# Patient Record
Sex: FEMALE | Race: WHITE | NOT HISPANIC OR LATINO | Employment: FULL TIME | URBAN - METROPOLITAN AREA
[De-identification: names, ages, dates, MRNs, and addresses within clinical notes are randomized per-mention and may not be internally consistent; named-entity substitution may affect disease eponyms.]

---

## 2023-10-03 ENCOUNTER — PHARMACY VISIT (OUTPATIENT)
Dept: PHARMACY | Facility: CLINIC | Age: 58
End: 2023-10-03
Payer: COMMERCIAL

## 2023-10-17 RX ORDER — NALTREXONE HYDROCHLORIDE AND BUPROPION HYDROCHLORIDE 8; 90 MG/1; MG/1
1 TABLET, EXTENDED RELEASE ORAL 2 TIMES DAILY
Qty: 120 TABLET | Refills: 0 | Status: SHIPPED | OUTPATIENT
Start: 2023-10-17 | End: 2023-11-14

## 2023-10-20 PROBLEM — R53.83 FATIGUE: Status: ACTIVE | Noted: 2023-10-20

## 2023-10-20 PROBLEM — M79.7 FIBROMYALGIA: Status: ACTIVE | Noted: 2023-10-20

## 2023-10-20 PROBLEM — M47.816 DEGENERATIVE JOINT DISEASE (DJD) OF LUMBAR SPINE: Status: ACTIVE | Noted: 2023-10-20

## 2023-10-20 PROBLEM — M25.561 ARTHRALGIA OF BOTH KNEES: Status: ACTIVE | Noted: 2023-10-20

## 2023-10-20 PROBLEM — K25.9 GASTRIC ULCER: Status: ACTIVE | Noted: 2023-10-20

## 2023-10-20 PROBLEM — R14.0 ABDOMINAL BLOATING: Status: ACTIVE | Noted: 2023-10-20

## 2023-10-20 PROBLEM — M32.9 SYSTEMIC LUPUS ERYTHEMATOSUS (MULTI): Status: ACTIVE | Noted: 2023-10-20

## 2023-10-20 PROBLEM — M46.1 SACROILIITIS (CMS-HCC): Status: ACTIVE | Noted: 2023-10-20

## 2023-10-20 PROBLEM — K57.30 DIVERTICULAR DISEASE OF COLON: Status: ACTIVE | Noted: 2023-10-20

## 2023-10-20 PROBLEM — K90.9 MALABSORPTION SYNDROME (HHS-HCC): Status: ACTIVE | Noted: 2023-10-20

## 2023-10-20 PROBLEM — A09 TRAVELERS' DIARRHEA: Status: ACTIVE | Noted: 2023-10-20

## 2023-10-20 PROBLEM — R10.33 PERIUMBILICAL ABDOMINAL PAIN: Status: ACTIVE | Noted: 2023-10-20

## 2023-10-20 PROBLEM — M25.562 ARTHRALGIA OF BOTH KNEES: Status: ACTIVE | Noted: 2023-10-20

## 2023-10-20 PROBLEM — G43.019 INTRACTABLE MIGRAINE WITHOUT AURA AND WITHOUT STATUS MIGRAINOSUS: Status: ACTIVE | Noted: 2023-10-20

## 2023-10-20 PROBLEM — G89.29 BACK PAIN, CHRONIC: Status: ACTIVE | Noted: 2023-10-20

## 2023-10-20 PROBLEM — N95.1 MENOPAUSAL SYNDROME (HOT FLUSHES): Status: ACTIVE | Noted: 2023-10-20

## 2023-10-20 PROBLEM — Z98.0 STATUS POST BYPASS GASTROJEJUNOSTOMY: Status: ACTIVE | Noted: 2023-10-20

## 2023-10-20 PROBLEM — M54.9 BACK PAIN, CHRONIC: Status: ACTIVE | Noted: 2023-10-20

## 2023-10-20 PROBLEM — M19.90 OSTEOARTHRITIS: Status: ACTIVE | Noted: 2023-10-20

## 2023-10-20 PROBLEM — F41.9 ANXIETY: Status: ACTIVE | Noted: 2023-10-20

## 2023-10-20 PROBLEM — I26.99 PULMONARY EMBOLISM (MULTI): Status: ACTIVE | Noted: 2023-10-20

## 2023-10-20 PROBLEM — M17.12 LEFT KNEE DJD: Status: ACTIVE | Noted: 2023-10-20

## 2023-10-20 PROBLEM — E53.8 VITAMIN B12 DEFICIENCY: Status: ACTIVE | Noted: 2023-10-20

## 2023-10-20 PROBLEM — K21.9 GERD (GASTROESOPHAGEAL REFLUX DISEASE): Status: ACTIVE | Noted: 2023-10-20

## 2023-10-20 PROBLEM — R10.32 CONTINUOUS LLQ ABDOMINAL PAIN: Status: ACTIVE | Noted: 2023-10-20

## 2023-10-20 PROBLEM — M41.80 DEXTROSCOLIOSIS: Status: ACTIVE | Noted: 2023-10-20

## 2023-10-20 RX ORDER — PANTOPRAZOLE SODIUM 40 MG/1
1 TABLET, DELAYED RELEASE ORAL DAILY
COMMUNITY
Start: 2017-06-13 | End: 2023-10-24 | Stop reason: SDUPTHER

## 2023-10-20 RX ORDER — GABAPENTIN 400 MG/1
2 CAPSULE ORAL 3 TIMES DAILY
COMMUNITY
End: 2023-10-24 | Stop reason: SDUPTHER

## 2023-10-20 RX ORDER — CITALOPRAM 20 MG/1
20 TABLET, FILM COATED ORAL DAILY
COMMUNITY
Start: 2023-04-14 | End: 2023-10-24 | Stop reason: ALTCHOICE

## 2023-10-20 RX ORDER — FLUTICASONE PROPIONATE 50 MCG
2 SPRAY, SUSPENSION (ML) NASAL DAILY
COMMUNITY
End: 2023-12-07 | Stop reason: ALTCHOICE

## 2023-10-20 RX ORDER — CALCIUM CARBONATE/VITAMIN D3 500-10/5ML
1 LIQUID (ML) ORAL DAILY
COMMUNITY
End: 2023-12-07 | Stop reason: ALTCHOICE

## 2023-10-20 RX ORDER — MONTELUKAST SODIUM 10 MG/1
1 TABLET ORAL DAILY
COMMUNITY
End: 2023-12-07 | Stop reason: ALTCHOICE

## 2023-10-20 RX ORDER — PLANT STANOL ESTER 450 MG
1 TABLET ORAL DAILY
COMMUNITY
End: 2023-12-07 | Stop reason: ALTCHOICE

## 2023-10-23 ENCOUNTER — OFFICE VISIT (OUTPATIENT)
Dept: PRIMARY CARE | Facility: CLINIC | Age: 58
End: 2023-10-23
Payer: COMMERCIAL

## 2023-10-23 VITALS
HEART RATE: 68 BPM | WEIGHT: 151 LBS | BODY MASS INDEX: 25.16 KG/M2 | DIASTOLIC BLOOD PRESSURE: 74 MMHG | HEIGHT: 65 IN | TEMPERATURE: 98 F | SYSTOLIC BLOOD PRESSURE: 108 MMHG

## 2023-10-23 DIAGNOSIS — Z12.4 SCREENING FOR CERVICAL CANCER: Primary | ICD-10-CM

## 2023-10-23 DIAGNOSIS — Z12.31 ENCOUNTER FOR SCREENING MAMMOGRAM FOR BREAST CANCER: ICD-10-CM

## 2023-10-23 PROCEDURE — 87624 HPV HI-RISK TYP POOLED RSLT: CPT

## 2023-10-23 PROCEDURE — 1036F TOBACCO NON-USER: CPT | Performed by: INTERNAL MEDICINE

## 2023-10-23 ASSESSMENT — ENCOUNTER SYMPTOMS
OCCASIONAL FEELINGS OF UNSTEADINESS: 0
LOSS OF SENSATION IN FEET: 0
DEPRESSION: 0

## 2023-10-23 ASSESSMENT — PATIENT HEALTH QUESTIONNAIRE - PHQ9
2. FEELING DOWN, DEPRESSED OR HOPELESS: NOT AT ALL
SUM OF ALL RESPONSES TO PHQ9 QUESTIONS 1 AND 2: 0
1. LITTLE INTEREST OR PLEASURE IN DOING THINGS: NOT AT ALL

## 2023-10-23 NOTE — PROGRESS NOTES
"Subjective   Patient ID: Nano Valverde is a 58 y.o. female who presents for Gynecologic Exam (PAP).    HPI   PCP Dr Ramsey. Presents for pap. Last >5 years ago. She had abnormal paps in the past. Never had cervical cancer. She was hpv in the past and did have colposcopy. She does not recall result of last pap. Does not do self breast exam. Due for mammogram.  Onset menses age 11. Last menses age 50  Today is her birthday. Going out to lunch with her mom.      Objective   /74   Pulse 68   Temp 36.7 °C (98 °F) (Temporal)   Ht 1.651 m (5' 5\")   Wt 68.5 kg (151 lb)   BMI 25.13 kg/m²     Physical Exam  Breast: Both are symmetrical no nipple discharge.  No skin changes.  No mass palpated.  No axillary adenopathy.  Pap: no external lesions. Cervix well visualized. Normal color. No discharge  Sample obtained for pap  No mass on bimanual    Assessment/Plan   Diagnoses and all orders for this visit:  Screening for cervical cancer  -     THINPREP PAP TEST  Encounter for screening mammogram for breast cancer  -     BI mammo bilateral screening tomosynthesis; Future  Notify patient of test results when available.      "

## 2023-10-24 DIAGNOSIS — M25.561 ARTHRALGIA OF BOTH KNEES: ICD-10-CM

## 2023-10-24 DIAGNOSIS — K25.9 GASTRIC ULCER, UNSPECIFIED CHRONICITY, UNSPECIFIED WHETHER GASTRIC ULCER HEMORRHAGE OR PERFORATION PRESENT: ICD-10-CM

## 2023-10-24 DIAGNOSIS — M25.562 ARTHRALGIA OF BOTH KNEES: ICD-10-CM

## 2023-10-24 DIAGNOSIS — F41.9 ANXIETY: Primary | ICD-10-CM

## 2023-10-24 RX ORDER — GABAPENTIN 400 MG/1
800 CAPSULE ORAL 2 TIMES DAILY
Qty: 180 CAPSULE | Refills: 3 | Status: SHIPPED | OUTPATIENT
Start: 2023-10-24 | End: 2023-12-07 | Stop reason: SDUPTHER

## 2023-10-24 RX ORDER — TIZANIDINE 2 MG/1
2 TABLET ORAL NIGHTLY
Qty: 90 TABLET | Refills: 3 | Status: SHIPPED | OUTPATIENT
Start: 2023-10-24 | End: 2023-12-07 | Stop reason: SDUPTHER

## 2023-10-24 RX ORDER — TOPIRAMATE 50 MG/1
50 TABLET, FILM COATED ORAL 2 TIMES DAILY
Qty: 180 TABLET | Refills: 3 | Status: SHIPPED | OUTPATIENT
Start: 2023-10-24 | End: 2023-12-07 | Stop reason: ALTCHOICE

## 2023-10-24 RX ORDER — TOPIRAMATE 100 MG/1
100 TABLET, FILM COATED ORAL 2 TIMES DAILY
Qty: 180 TABLET | Refills: 3 | Status: SHIPPED | OUTPATIENT
Start: 2023-10-24 | End: 2023-12-07 | Stop reason: SDUPTHER

## 2023-10-24 RX ORDER — PANTOPRAZOLE SODIUM 40 MG/1
40 TABLET, DELAYED RELEASE ORAL DAILY
Qty: 90 TABLET | Refills: 3 | Status: SHIPPED | OUTPATIENT
Start: 2023-10-24 | End: 2023-12-07 | Stop reason: ALTCHOICE

## 2023-11-07 ENCOUNTER — TELEPHONE (OUTPATIENT)
Dept: PRIMARY CARE | Facility: CLINIC | Age: 58
End: 2023-11-07
Payer: COMMERCIAL

## 2023-11-07 LAB
CYTOLOGY CMNT CVX/VAG CYTO-IMP: NORMAL
HPV HR GENOTYPES PNL CVX NAA+PROBE: NEGATIVE
HPV HR GENOTYPES PNL CVX NAA+PROBE: NEGATIVE
HPV16 DNA SPEC QL NAA+PROBE: NEGATIVE
HPV18 DNA SPEC QL NAA+PROBE: NEGATIVE
LAB AP HPV GENOTYPE QUESTION: YES
LAB AP HPV HR: NORMAL
LABORATORY COMMENT REPORT: NORMAL
LABORATORY COMMENT REPORT: NORMAL
PATH REPORT.TOTAL CANCER: NORMAL

## 2023-11-14 RX ORDER — NALTREXONE HYDROCHLORIDE AND BUPROPION HYDROCHLORIDE 8; 90 MG/1; MG/1
1 TABLET, EXTENDED RELEASE ORAL 2 TIMES DAILY
Qty: 120 TABLET | Refills: 0 | Status: SHIPPED | OUTPATIENT
Start: 2023-11-14 | End: 2023-12-07 | Stop reason: ALTCHOICE

## 2023-12-07 ENCOUNTER — OFFICE VISIT (OUTPATIENT)
Dept: PRIMARY CARE | Facility: CLINIC | Age: 58
End: 2023-12-07
Payer: COMMERCIAL

## 2023-12-07 VITALS
TEMPERATURE: 98 F | WEIGHT: 146 LBS | SYSTOLIC BLOOD PRESSURE: 93 MMHG | BODY MASS INDEX: 24.32 KG/M2 | HEIGHT: 65 IN | DIASTOLIC BLOOD PRESSURE: 63 MMHG | HEART RATE: 73 BPM

## 2023-12-07 DIAGNOSIS — K25.9 GASTRIC ULCER, UNSPECIFIED CHRONICITY, UNSPECIFIED WHETHER GASTRIC ULCER HEMORRHAGE OR PERFORATION PRESENT: ICD-10-CM

## 2023-12-07 DIAGNOSIS — Z13.29 SCREENING FOR THYROID DISORDER: ICD-10-CM

## 2023-12-07 DIAGNOSIS — Z13.220 SCREENING FOR LIPID DISORDERS: Primary | ICD-10-CM

## 2023-12-07 DIAGNOSIS — F41.9 ANXIETY: ICD-10-CM

## 2023-12-07 DIAGNOSIS — Z13.1 SCREENING FOR DIABETES MELLITUS: ICD-10-CM

## 2023-12-07 DIAGNOSIS — M25.561 ARTHRALGIA OF BOTH KNEES: ICD-10-CM

## 2023-12-07 DIAGNOSIS — M25.562 ARTHRALGIA OF BOTH KNEES: ICD-10-CM

## 2023-12-07 PROCEDURE — 3008F BODY MASS INDEX DOCD: CPT | Performed by: STUDENT IN AN ORGANIZED HEALTH CARE EDUCATION/TRAINING PROGRAM

## 2023-12-07 PROCEDURE — 1036F TOBACCO NON-USER: CPT | Performed by: STUDENT IN AN ORGANIZED HEALTH CARE EDUCATION/TRAINING PROGRAM

## 2023-12-07 PROCEDURE — 99396 PREV VISIT EST AGE 40-64: CPT | Performed by: STUDENT IN AN ORGANIZED HEALTH CARE EDUCATION/TRAINING PROGRAM

## 2023-12-07 RX ORDER — GABAPENTIN 400 MG/1
800 CAPSULE ORAL 2 TIMES DAILY
Qty: 180 CAPSULE | Refills: 3 | Status: SHIPPED | OUTPATIENT
Start: 2023-12-07

## 2023-12-07 RX ORDER — TOPIRAMATE 100 MG/1
100 TABLET, FILM COATED ORAL 2 TIMES DAILY
Qty: 180 TABLET | Refills: 3 | Status: SHIPPED | OUTPATIENT
Start: 2023-12-07 | End: 2024-12-05

## 2023-12-07 RX ORDER — TIZANIDINE 2 MG/1
2 TABLET ORAL NIGHTLY
Qty: 90 TABLET | Refills: 3 | Status: SHIPPED | OUTPATIENT
Start: 2023-12-07 | End: 2024-12-05

## 2023-12-07 ASSESSMENT — ACTIVITIES OF DAILY LIVING (ADL)
DOING_HOUSEWORK: INDEPENDENT
BATHING: INDEPENDENT
DRESSING: INDEPENDENT
GROCERY_SHOPPING: INDEPENDENT
MANAGING_FINANCES: INDEPENDENT
TAKING_MEDICATION: INDEPENDENT

## 2023-12-07 ASSESSMENT — ENCOUNTER SYMPTOMS
SHORTNESS OF BREATH: 0
CONSTIPATION: 0
HALLUCINATIONS: 0
DIARRHEA: 0
ABDOMINAL PAIN: 0
PALPITATIONS: 0
DYSURIA: 0
VOMITING: 0
FEVER: 0
WHEEZING: 0
SORE THROAT: 0
COUGH: 0
POLYDIPSIA: 0
MYALGIAS: 0
APPETITE CHANGE: 0
EYE PAIN: 0
NAUSEA: 0
FATIGUE: 0
EYE DISCHARGE: 0
FREQUENCY: 0
OCCASIONAL FEELINGS OF UNSTEADINESS: 0
HEMATURIA: 0
LOSS OF SENSATION IN FEET: 0
HEADACHES: 0
DEPRESSION: 0

## 2023-12-07 ASSESSMENT — COLUMBIA-SUICIDE SEVERITY RATING SCALE - C-SSRS
1. IN THE PAST MONTH, HAVE YOU WISHED YOU WERE DEAD OR WISHED YOU COULD GO TO SLEEP AND NOT WAKE UP?: NO
6. HAVE YOU EVER DONE ANYTHING, STARTED TO DO ANYTHING, OR PREPARED TO DO ANYTHING TO END YOUR LIFE?: NO
2. HAVE YOU ACTUALLY HAD ANY THOUGHTS OF KILLING YOURSELF?: NO

## 2023-12-07 ASSESSMENT — PATIENT HEALTH QUESTIONNAIRE - PHQ9
1. LITTLE INTEREST OR PLEASURE IN DOING THINGS: NOT AT ALL
2. FEELING DOWN, DEPRESSED OR HOPELESS: NOT AT ALL
SUM OF ALL RESPONSES TO PHQ9 QUESTIONS 1 AND 2: 0

## 2023-12-07 NOTE — PROGRESS NOTES
Subjective   Nano Valverde is a 58 y.o. female and is here for a comprehensive physical exam. The patient reports no problems.    Do you take any herbs or supplements that were not prescribed by a doctor? no  Are you taking calcium supplements? yes  Are you taking aspirin daily? no     Cancer:   -Menopause age:   -Colon (Age > 50): Next 2027  -PAP (Age > 20): Up to date; has obgyn  -Breast (Age > 40): Pending  -Lung (Age 55-80, 30 pack year, cessation within 15 years): Never smoker    Vaccination  Tetnus: Up to date; Next 2025  Shingle (Age > 50): Due  Flu: Up to date    Hep C (Born [birth year]) ONCE: Negative  Osteoporosis screening (Age > 59 or Fragility fracture): N/A    Tobacco: Never smoker  Alcohol: Denies  Diabetes: Denies  Lipids: Denies  Cognitive: Denies any Cognitive deficits. No cognitive deficits noted.      Do you have pain that bothers you in your daily life? no  Review of Systems   Constitutional:  Negative for appetite change, fatigue and fever.   HENT:  Negative for congestion, ear discharge, ear pain, hearing loss and sore throat.    Eyes:  Negative for pain and discharge.   Respiratory:  Negative for cough, shortness of breath and wheezing.    Cardiovascular:  Negative for chest pain, palpitations and leg swelling.   Gastrointestinal:  Negative for abdominal pain, constipation, diarrhea, nausea and vomiting.   Endocrine: Negative for cold intolerance, heat intolerance and polydipsia.   Genitourinary:  Negative for dysuria, frequency and hematuria.   Musculoskeletal:  Negative for gait problem and myalgias.   Skin:  Negative for rash.   Neurological:  Negative for syncope and headaches.   Psychiatric/Behavioral:  Negative for hallucinations and suicidal ideas.         Objective   Physical Exam  Constitutional:       Appearance: Normal appearance.   HENT:      Head: Normocephalic and atraumatic.      Right Ear: External ear normal.      Left Ear: External ear normal.      Nose: Nose normal.       Mouth/Throat:      Mouth: Mucous membranes are moist.   Eyes:      Extraocular Movements: Extraocular movements intact.      Conjunctiva/sclera: Conjunctivae normal.      Pupils: Pupils are equal, round, and reactive to light.   Cardiovascular:      Rate and Rhythm: Normal rate and regular rhythm.      Pulses: Normal pulses.      Heart sounds: Normal heart sounds.   Pulmonary:      Effort: Pulmonary effort is normal.      Breath sounds: Normal breath sounds.   Abdominal:      General: Abdomen is flat.      Palpations: Abdomen is soft.   Neurological:      General: No focal deficit present.      Mental Status: She is alert and oriented to person, place, and time.   Psychiatric:         Mood and Affect: Mood normal.         Behavior: Behavior normal.       Assessment/Plan   1. Preventive screenings as stated above.   2. Patient Counseling:  --Nutrition: Stressed importance of moderation in sodium/caffeine intake, saturated fat and cholesterol, caloric balance, sufficient intake of fresh fruits, vegetables, fiber, calcium, iron, and 1 mg of folate supplement per day (for females capable of pregnancy).  --Discussed the issue of estrogen replacement, calcium supplement, and the daily use of baby aspirin.  --Exercise: Stressed the importance of regular exercise.   --Substance Abuse: Discussed cessation/primary prevention of tobacco, alcohol, or other drug use; driving or other dangerous activities under the influence; availability of treatment for abuse.    --Sexuality: Discussed sexually transmitted diseases, partner selection, use of condoms, avoidance of unintended pregnancy  and contraceptive alternatives.   --Injury prevention: Discussed safety belts, safety helmets, smoke detector, smoking near bedding or upholstery.   --Dental health: Discussed importance of regular tooth brushing, flossing, and dental visits.  --Immunizations reviewed.  --Discussed benefits of screening colonoscopy.  --After hours service  discussed with patient  3. Discussed the patient's BMI with her.  The BMI is in the acceptable range.  4. Follow up in one year

## 2023-12-22 ENCOUNTER — ANCILLARY PROCEDURE (OUTPATIENT)
Dept: RADIOLOGY | Facility: CLINIC | Age: 58
End: 2023-12-22
Payer: COMMERCIAL

## 2023-12-22 DIAGNOSIS — Z12.31 ENCOUNTER FOR SCREENING MAMMOGRAM FOR BREAST CANCER: ICD-10-CM

## 2023-12-22 PROCEDURE — 77067 SCR MAMMO BI INCL CAD: CPT | Performed by: RADIOLOGY

## 2023-12-22 PROCEDURE — 77063 BREAST TOMOSYNTHESIS BI: CPT | Performed by: RADIOLOGY

## 2023-12-22 PROCEDURE — 77063 BREAST TOMOSYNTHESIS BI: CPT

## 2023-12-29 DIAGNOSIS — K21.9 GASTROESOPHAGEAL REFLUX DISEASE WITHOUT ESOPHAGITIS: Primary | ICD-10-CM

## 2023-12-29 RX ORDER — ONDANSETRON 4 MG/1
4 TABLET, FILM COATED ORAL EVERY 8 HOURS PRN
Qty: 30 TABLET | Refills: 3 | Status: SHIPPED | OUTPATIENT
Start: 2023-12-29 | End: 2024-02-07

## 2024-01-02 ENCOUNTER — TELEPHONE (OUTPATIENT)
Dept: PRIMARY CARE | Facility: CLINIC | Age: 59
End: 2024-01-02
Payer: COMMERCIAL

## 2024-01-02 NOTE — TELEPHONE ENCOUNTER
----- Message from Andree Bartlett MD sent at 12/28/2023  2:53 PM EST -----  Please let patient know mammogram is normal.

## 2024-02-26 ENCOUNTER — OFFICE VISIT (OUTPATIENT)
Dept: PRIMARY CARE | Facility: CLINIC | Age: 59
End: 2024-02-26
Payer: COMMERCIAL

## 2024-02-26 VITALS
HEART RATE: 78 BPM | SYSTOLIC BLOOD PRESSURE: 100 MMHG | OXYGEN SATURATION: 93 % | WEIGHT: 145 LBS | TEMPERATURE: 97.5 F | BODY MASS INDEX: 24.16 KG/M2 | DIASTOLIC BLOOD PRESSURE: 61 MMHG | HEIGHT: 65 IN

## 2024-02-26 DIAGNOSIS — J01.90 ACUTE NON-RECURRENT SINUSITIS, UNSPECIFIED LOCATION: Primary | ICD-10-CM

## 2024-02-26 PROCEDURE — 1036F TOBACCO NON-USER: CPT | Performed by: INTERNAL MEDICINE

## 2024-02-26 PROCEDURE — 3008F BODY MASS INDEX DOCD: CPT | Performed by: INTERNAL MEDICINE

## 2024-02-26 PROCEDURE — 99213 OFFICE O/P EST LOW 20 MIN: CPT | Performed by: INTERNAL MEDICINE

## 2024-02-26 RX ORDER — AZITHROMYCIN 250 MG/1
TABLET, FILM COATED ORAL
Qty: 10 TABLET | Refills: 0 | Status: SHIPPED | OUTPATIENT
Start: 2024-02-26

## 2024-02-26 ASSESSMENT — LIFESTYLE VARIABLES
SKIP TO QUESTIONS 9-10: 1
HAS A RELATIVE, FRIEND, DOCTOR, OR ANOTHER HEALTH PROFESSIONAL EXPRESSED CONCERN ABOUT YOUR DRINKING OR SUGGESTED YOU CUT DOWN: NO
AUDIT TOTAL SCORE: 0
HOW OFTEN DO YOU HAVE SIX OR MORE DRINKS ON ONE OCCASION: NEVER
AUDIT-C TOTAL SCORE: 0
HOW OFTEN DO YOU HAVE A DRINK CONTAINING ALCOHOL: NEVER
HOW MANY STANDARD DRINKS CONTAINING ALCOHOL DO YOU HAVE ON A TYPICAL DAY: PATIENT DOES NOT DRINK
HAVE YOU OR SOMEONE ELSE BEEN INJURED AS A RESULT OF YOUR DRINKING: NO

## 2024-02-26 NOTE — PROGRESS NOTES
"Subjective   Patient ID: Nano Valverde is a 58 y.o. female who presents for head congestion (X 3 days).    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 Pt did a home COVID test during the weekend and it was negative. During the clinical encounter pt denies fever, chills, no SOB, no chest pain/tightness, no abdominal pain, no N/V/D reported, no change of urination reported. Pt denies any other health concerns during this visit.  Sohx: reviewed  PMHx and Fhx: reviewed    Review of Systems  12 Systems have been reviewed as follows:   Constitutional: no fever, no chills  Eyes: no eyesight problems, no eye redness, no eye pain, no blurred vision  ENT: as mentioned  Cardiovascular: no chest pain or tightness, no SOB, the heart rate was not fast or slow  Respiratory: no cough, no dyspnea with exertion or with rest, no wheezing  Gastrointestinal: no abdominal pain, no constipation or diarrhea, no heartburn, no nausea or vomiting  Genitourinary: no urine frequency, no dysuria, no urinary urgency, no urinary incontinence or retention  Musculoskeletal: no back pain, no arthralgia, no joint swelling or stiffness, no muscle weakness  Integumentary: no skin lesions or rash  Neurological: no headaches, no dizziness or fainting, no limb weakness  Psychiatric: no mood changes, no anxiety or depression, no suicidal thoughts  Endocrine: no weight change, no temperature intolerance, no change of appetite  Hematologic/Lymphatic: no easy bruising or bleeding  Objective   /61 (BP Location: Left arm, Patient Position: Sitting)   Pulse 78   Temp 36.4 °C (97.5 °F)   Ht 1.657 m (5' 5.25\")   Wt 65.8 kg (145 lb)   SpO2 93%   BMI 23.94 kg/m²     Physical Exam  Constitutional: well hydrated, well nourished, no acute distress. Vital signs reviewed  Head and face: normocephalic, atraumatic  Eyes: no erythema, edema or visible " abnormalities of conjunctiva or lids. Pupils are equal, round and reactive to light. Extraocular movement normal  ENT: external ears without deformitie  Neck: full ROM, no adenopathy, neck was supple, thyroid gland not enlarged, no palpable nodules  Pulmonary: normal respiratory rate and effort. Lungs are clear to auscultation, no rales,no rhonchi or wheezing  Cardiovascular: RRR, normal S1 and S2, no murmur, no gallop, posterior tib. pulse normal 2+ bilaterally, no lower extremity edema  Abdomen: soft, non tender on palpation, not distended, normal BS in all 4 quadrants  Musculoskeletal: no joint swelling, normal movements of all 4 extremities. Gait and station: normal, Digits and nails: normal without clubbing  Skin: normal color, no rash  Neurologic: Cranial nerves: CN II: visual acuity intact. Source: acuity reported by patient. Pupils reactive to light with normal accommodation. Right and left pupil normal CN III, IV and VI: the EO movements were intact. CN VIII: no hearing loss.Sensory exam: normal light to touch  Psychiatric: Mood and affect: normal, Alert and Oriented x 3  Lymphatic: no cervical lymphadenopathy  Assessment/Plan   Acute sinusitis:  Hx and PE as mentioned  Will start on azithromycin 250 mg PO daily for 10 days. Pt is s/p RYGB back to 2004 and usually needs to be treated for a little longer. Pt was able to tolerate pills with no issue.  Pt was instructed to contact PCP if pt will have no improvement of the condition/worsening of the sxs/new sxs on the current treatment  Plan was d/c with the pt in details, verbalized understanding, agreed  Please follow up with PCP as planned or sooner if needed

## 2024-08-28 ENCOUNTER — LAB (OUTPATIENT)
Dept: LAB | Facility: LAB | Age: 59
End: 2024-08-28
Payer: COMMERCIAL

## 2024-08-28 LAB — COTININE UR QL SCN: NEGATIVE

## 2024-09-25 ENCOUNTER — OFFICE VISIT (OUTPATIENT)
Dept: PRIMARY CARE | Facility: CLINIC | Age: 59
End: 2024-09-25
Payer: COMMERCIAL

## 2024-09-25 VITALS
HEART RATE: 71 BPM | DIASTOLIC BLOOD PRESSURE: 68 MMHG | WEIGHT: 146 LBS | SYSTOLIC BLOOD PRESSURE: 95 MMHG | BODY MASS INDEX: 24.32 KG/M2 | HEIGHT: 65 IN

## 2024-09-25 DIAGNOSIS — K90.9 MALABSORPTION SYNDROME (HHS-HCC): ICD-10-CM

## 2024-09-25 DIAGNOSIS — E61.1 IRON DEFICIENCY: ICD-10-CM

## 2024-09-25 DIAGNOSIS — E55.9 VITAMIN D DEFICIENCY: ICD-10-CM

## 2024-09-25 DIAGNOSIS — Z00.00 HEALTH MAINTENANCE EXAMINATION: ICD-10-CM

## 2024-09-25 DIAGNOSIS — G47.62 NOCTURNAL LEG CRAMPS: Primary | ICD-10-CM

## 2024-09-25 DIAGNOSIS — E53.8 VITAMIN B12 DEFICIENCY: ICD-10-CM

## 2024-09-25 DIAGNOSIS — F41.9 ANXIETY: ICD-10-CM

## 2024-09-25 DIAGNOSIS — R53.83 OTHER FATIGUE: ICD-10-CM

## 2024-09-25 DIAGNOSIS — Z13.220 LIPID SCREENING: ICD-10-CM

## 2024-09-25 DIAGNOSIS — M25.562 ARTHRALGIA OF BOTH KNEES: ICD-10-CM

## 2024-09-25 DIAGNOSIS — M25.561 ARTHRALGIA OF BOTH KNEES: ICD-10-CM

## 2024-09-25 PROCEDURE — 99213 OFFICE O/P EST LOW 20 MIN: CPT | Performed by: INTERNAL MEDICINE

## 2024-09-25 PROCEDURE — 3008F BODY MASS INDEX DOCD: CPT | Performed by: INTERNAL MEDICINE

## 2024-09-25 RX ORDER — TIZANIDINE 4 MG/1
4 TABLET ORAL NIGHTLY PRN
Qty: 90 TABLET | Refills: 3 | Status: SHIPPED | OUTPATIENT
Start: 2024-09-25 | End: 2025-09-25

## 2024-09-25 RX ORDER — GABAPENTIN 400 MG/1
800 CAPSULE ORAL 2 TIMES DAILY
Qty: 180 CAPSULE | Refills: 3 | Status: SHIPPED | OUTPATIENT
Start: 2024-09-25

## 2024-09-25 RX ORDER — CALCIUM CARBONATE/VITAMIN D3 500-10/5ML
1 LIQUID (ML) ORAL DAILY
COMMUNITY

## 2024-09-25 RX ORDER — PLANT STANOL ESTER 450 MG
1 TABLET ORAL DAILY
COMMUNITY

## 2024-09-25 RX ORDER — TOPIRAMATE 100 MG/1
100 TABLET, FILM COATED ORAL 2 TIMES DAILY
Qty: 180 TABLET | Refills: 3 | Status: SHIPPED | OUTPATIENT
Start: 2024-09-25 | End: 2025-09-24

## 2024-09-25 NOTE — PROGRESS NOTES
"Subjective   Patient ID: Nano Valverde is a 58 y.o. female who presents for Med Refill.    HPI Patient is a 58-year-old female that presents to the office today for medication refills.  Patient needs refills on all of her medications today she is on gabapentin, Topamax, tizanidine.  Patient has history of fibromyalgia, chronic back pain, osteoarthritis and arthralgia of both knees, degenerative joint disease of the lumbar spine and history of migraines for which she is on Topamax.  Patient is tolerating her medications well without adverse effects.  Patient also due for blood work would like blood work orders at this time.  Blood pressure in the office today was 95/68 BMI is within normal range and heart rate was 71.  OARRS report was reviewed.    Review of Systems   Constitutional:  Positive for fatigue. Negative for chills and fever.   HENT:  Negative for sore throat.    Eyes:  Negative for visual disturbance.   Respiratory:  Negative for cough and shortness of breath.    Cardiovascular:  Negative for chest pain and leg swelling.   Musculoskeletal:  Positive for arthralgias.   Skin:  Negative for rash.   Neurological:  Negative for dizziness, syncope and light-headedness.   Psychiatric/Behavioral:  Negative for agitation.        Objective   BP 95/68   Pulse 71   Ht 1.651 m (5' 5\")   Wt 66.2 kg (146 lb)   BMI 24.30 kg/m²     Physical Exam  Vitals and nursing note reviewed.   Constitutional:       General: She is not in acute distress.     Appearance: Normal appearance. She is normal weight. She is not ill-appearing, toxic-appearing or diaphoretic.   HENT:      Head: Normocephalic and atraumatic.      Mouth/Throat:      Mouth: Mucous membranes are moist.      Pharynx: Oropharynx is clear. No oropharyngeal exudate.   Eyes:      Pupils: Pupils are equal, round, and reactive to light.   Cardiovascular:      Rate and Rhythm: Normal rate and regular rhythm.      Heart sounds: Normal heart sounds.   Pulmonary:      " Effort: Pulmonary effort is normal. No respiratory distress.      Breath sounds: Normal breath sounds. No wheezing, rhonchi or rales.   Abdominal:      General: Bowel sounds are normal. There is no distension.      Palpations: Abdomen is soft.      Tenderness: There is no abdominal tenderness.   Musculoskeletal:      Cervical back: Neck supple.      Right lower leg: No edema.      Left lower leg: No edema.   Lymphadenopathy:      Cervical: No cervical adenopathy.   Skin:     General: Skin is warm and dry.      Coloration: Skin is not jaundiced or pale.      Findings: No rash.   Neurological:      General: No focal deficit present.      Mental Status: She is alert and oriented to person, place, and time.      Cranial Nerves: No cranial nerve deficit.   Psychiatric:         Mood and Affect: Mood normal.         Behavior: Behavior normal.         Thought Content: Thought content normal.         Judgment: Judgment normal.         Assessment/Plan   Problem List Items Addressed This Visit             ICD-10-CM    Anxiety F41.9    Relevant Medications    topiramate (Topamax) 100 mg tablet    Arthralgia of both knees M25.561, M25.562    Relevant Medications    gabapentin (Neurontin) 400 mg capsule    Fatigue R53.83    Relevant Orders    TSH with reflex to Free T4 if abnormal (Completed)    Malabsorption syndrome ([facility]) K90.9    Vitamin B12 deficiency E53.8    Relevant Orders    Vitamin B12 (Completed)    Vitamin D deficiency E55.9    Relevant Orders    Vitamin D 25-Hydroxy,Total (for eval of Vitamin D levels) (Completed)    Nocturnal leg cramps - Primary G47.62    Relevant Medications    tiZANidine (Zanaflex) 4 mg tablet    Lipid screening Z13.220    Relevant Orders    Lipid panel (Completed)    Iron deficiency E61.1    Relevant Orders    Iron and TIBC (Completed)    Ferritin (Completed)     Other Visit Diagnoses         Codes    Health maintenance examination     Z00.00    Relevant Orders    CBC and Auto Differential  (Completed)    Comprehensive metabolic panel (Completed)          History of migraine headaches/anxiety: Refills provided for Topamax: Chronic, stable    Arthralgia of both knees/fibromyalgia: Refills provided for gabapentin.  OARRS report is reviewed and appropriate.  Patient tolerating medication well without adverse effects.    Fatigue: Check a TSH level    Malabsorption syndrome: Will check vitamin B12, vitamin D, iron levels    Iron deficiency: Will check iron level and ferritin level    Health maintenance examination: Will check CBC and CMP    Nocturnal leg cramps: Will check iron panel.  Refills provided for tizanidine

## 2024-09-30 ENCOUNTER — PHARMACY VISIT (OUTPATIENT)
Dept: PHARMACY | Facility: CLINIC | Age: 59
End: 2024-09-30
Payer: COMMERCIAL

## 2024-09-30 PROCEDURE — RXMED WILLOW AMBULATORY MEDICATION CHARGE

## 2024-09-30 RX ORDER — GABAPENTIN 400 MG/1
800 CAPSULE ORAL 2 TIMES DAILY
Qty: 180 CAPSULE | Refills: 3 | OUTPATIENT
Start: 2024-09-25

## 2024-10-03 ENCOUNTER — LAB (OUTPATIENT)
Dept: LAB | Facility: LAB | Age: 59
End: 2024-10-03
Payer: COMMERCIAL

## 2024-10-03 DIAGNOSIS — Z13.220 LIPID SCREENING: ICD-10-CM

## 2024-10-03 DIAGNOSIS — E53.8 VITAMIN B12 DEFICIENCY: ICD-10-CM

## 2024-10-03 DIAGNOSIS — E61.1 IRON DEFICIENCY: ICD-10-CM

## 2024-10-03 DIAGNOSIS — E55.9 VITAMIN D DEFICIENCY: ICD-10-CM

## 2024-10-03 DIAGNOSIS — Z00.00 HEALTH MAINTENANCE EXAMINATION: ICD-10-CM

## 2024-10-03 DIAGNOSIS — R53.83 OTHER FATIGUE: ICD-10-CM

## 2024-10-03 LAB
25(OH)D3 SERPL-MCNC: 34 NG/ML (ref 30–100)
ALBUMIN SERPL BCP-MCNC: 4.1 G/DL (ref 3.4–5)
ALP SERPL-CCNC: 81 U/L (ref 33–110)
ALT SERPL W P-5'-P-CCNC: 25 U/L (ref 7–45)
ANION GAP SERPL CALC-SCNC: 9 MMOL/L (ref 10–20)
AST SERPL W P-5'-P-CCNC: 21 U/L (ref 9–39)
BASOPHILS # BLD AUTO: 0.06 X10*3/UL (ref 0–0.1)
BASOPHILS NFR BLD AUTO: 1.2 %
BILIRUB SERPL-MCNC: 0.8 MG/DL (ref 0–1.2)
BUN SERPL-MCNC: 13 MG/DL (ref 6–23)
CALCIUM SERPL-MCNC: 9.3 MG/DL (ref 8.6–10.3)
CHLORIDE SERPL-SCNC: 105 MMOL/L (ref 98–107)
CHOLEST SERPL-MCNC: 194 MG/DL (ref 0–199)
CHOLESTEROL/HDL RATIO: 2.6
CO2 SERPL-SCNC: 31 MMOL/L (ref 21–32)
CREAT SERPL-MCNC: 0.73 MG/DL (ref 0.5–1.05)
EGFRCR SERPLBLD CKD-EPI 2021: >90 ML/MIN/1.73M*2
EOSINOPHIL # BLD AUTO: 0.12 X10*3/UL (ref 0–0.7)
EOSINOPHIL NFR BLD AUTO: 2.4 %
ERYTHROCYTE [DISTWIDTH] IN BLOOD BY AUTOMATED COUNT: 13.3 % (ref 11.5–14.5)
FERRITIN SERPL-MCNC: 49 NG/ML (ref 8–150)
GLUCOSE SERPL-MCNC: 90 MG/DL (ref 74–99)
HCT VFR BLD AUTO: 46.4 % (ref 36–46)
HDLC SERPL-MCNC: 74.1 MG/DL
HGB BLD-MCNC: 14.6 G/DL (ref 12–16)
IMM GRANULOCYTES # BLD AUTO: 0.01 X10*3/UL (ref 0–0.7)
IMM GRANULOCYTES NFR BLD AUTO: 0.2 % (ref 0–0.9)
IRON SATN MFR SERPL: 19 % (ref 25–45)
IRON SERPL-MCNC: 63 UG/DL (ref 35–150)
LDLC SERPL CALC-MCNC: 108 MG/DL
LYMPHOCYTES # BLD AUTO: 2.08 X10*3/UL (ref 1.2–4.8)
LYMPHOCYTES NFR BLD AUTO: 40.9 %
MCH RBC QN AUTO: 29 PG (ref 26–34)
MCHC RBC AUTO-ENTMCNC: 31.5 G/DL (ref 32–36)
MCV RBC AUTO: 92 FL (ref 80–100)
MONOCYTES # BLD AUTO: 0.37 X10*3/UL (ref 0.1–1)
MONOCYTES NFR BLD AUTO: 7.3 %
NEUTROPHILS # BLD AUTO: 2.44 X10*3/UL (ref 1.2–7.7)
NEUTROPHILS NFR BLD AUTO: 48 %
NON HDL CHOLESTEROL: 120 MG/DL (ref 0–149)
NRBC BLD-RTO: 0 /100 WBCS (ref 0–0)
PLATELET # BLD AUTO: 285 X10*3/UL (ref 150–450)
POTASSIUM SERPL-SCNC: 4.4 MMOL/L (ref 3.5–5.3)
PROT SERPL-MCNC: 6.7 G/DL (ref 6.4–8.2)
RBC # BLD AUTO: 5.03 X10*6/UL (ref 4–5.2)
SODIUM SERPL-SCNC: 141 MMOL/L (ref 136–145)
TIBC SERPL-MCNC: 333 UG/DL (ref 240–445)
TRIGL SERPL-MCNC: 60 MG/DL (ref 0–149)
TSH SERPL-ACNC: 3.19 MIU/L (ref 0.44–3.98)
UIBC SERPL-MCNC: 270 UG/DL (ref 110–370)
VIT B12 SERPL-MCNC: 408 PG/ML (ref 211–911)
VLDL: 12 MG/DL (ref 0–40)
WBC # BLD AUTO: 5.1 X10*3/UL (ref 4.4–11.3)

## 2024-10-03 PROCEDURE — 84443 ASSAY THYROID STIM HORMONE: CPT

## 2024-10-03 PROCEDURE — 82607 VITAMIN B-12: CPT

## 2024-10-03 PROCEDURE — 80061 LIPID PANEL: CPT

## 2024-10-03 PROCEDURE — 82306 VITAMIN D 25 HYDROXY: CPT

## 2024-10-03 PROCEDURE — 82728 ASSAY OF FERRITIN: CPT

## 2024-10-03 PROCEDURE — 83550 IRON BINDING TEST: CPT

## 2024-10-03 PROCEDURE — 80053 COMPREHEN METABOLIC PANEL: CPT

## 2024-10-03 PROCEDURE — 85025 COMPLETE CBC W/AUTO DIFF WBC: CPT

## 2024-10-03 PROCEDURE — 83540 ASSAY OF IRON: CPT

## 2024-10-03 PROCEDURE — 36415 COLL VENOUS BLD VENIPUNCTURE: CPT

## 2024-10-14 ENCOUNTER — NURSE ONLY (OUTPATIENT)
Dept: CARDIOLOGY | Facility: CLINIC | Age: 59
End: 2024-10-14
Payer: COMMERCIAL

## 2024-10-14 DIAGNOSIS — Z23 NEED FOR INFLUENZA VACCINATION: ICD-10-CM

## 2024-10-14 PROCEDURE — 90471 IMMUNIZATION ADMIN: CPT | Performed by: INTERNAL MEDICINE

## 2024-10-14 PROCEDURE — 90656 IIV3 VACC NO PRSV 0.5 ML IM: CPT | Performed by: INTERNAL MEDICINE

## 2024-10-15 PROBLEM — G47.62 NOCTURNAL LEG CRAMPS: Status: ACTIVE | Noted: 2024-10-15

## 2024-10-15 PROBLEM — E61.1 IRON DEFICIENCY: Status: ACTIVE | Noted: 2024-10-15

## 2024-10-15 PROBLEM — Z13.220 LIPID SCREENING: Status: ACTIVE | Noted: 2024-10-15

## 2024-10-15 ASSESSMENT — ENCOUNTER SYMPTOMS
SORE THROAT: 0
DIZZINESS: 0
COUGH: 0
LIGHT-HEADEDNESS: 0
CHILLS: 0
SHORTNESS OF BREATH: 0
FEVER: 0
AGITATION: 0

## 2024-10-16 ASSESSMENT — ENCOUNTER SYMPTOMS
FATIGUE: 1
ARTHRALGIAS: 1

## 2024-11-18 ENCOUNTER — PHARMACY VISIT (OUTPATIENT)
Dept: PHARMACY | Facility: CLINIC | Age: 59
End: 2024-11-18
Payer: COMMERCIAL

## 2024-11-18 PROCEDURE — RXMED WILLOW AMBULATORY MEDICATION CHARGE

## 2025-01-27 ENCOUNTER — PHARMACY VISIT (OUTPATIENT)
Dept: PHARMACY | Facility: CLINIC | Age: 60
End: 2025-01-27
Payer: COMMERCIAL

## 2025-01-27 PROCEDURE — RXMED WILLOW AMBULATORY MEDICATION CHARGE

## 2025-02-04 ENCOUNTER — TELEMEDICINE (OUTPATIENT)
Dept: PRIMARY CARE | Facility: CLINIC | Age: 60
End: 2025-02-04
Payer: COMMERCIAL

## 2025-02-04 DIAGNOSIS — B34.9 VIRAL ILLNESS: Primary | ICD-10-CM

## 2025-02-04 DIAGNOSIS — R11.0 NAUSEA: ICD-10-CM

## 2025-02-04 PROCEDURE — 99213 OFFICE O/P EST LOW 20 MIN: CPT | Performed by: INTERNAL MEDICINE

## 2025-02-04 PROCEDURE — 1036F TOBACCO NON-USER: CPT | Performed by: INTERNAL MEDICINE

## 2025-02-04 RX ORDER — ONDANSETRON 4 MG/1
4 TABLET, ORALLY DISINTEGRATING ORAL EVERY 8 HOURS PRN
Qty: 20 TABLET | Refills: 0 | Status: SHIPPED | OUTPATIENT
Start: 2025-02-04 | End: 2025-02-11

## 2025-02-04 ASSESSMENT — PATIENT HEALTH QUESTIONNAIRE - PHQ9
SUM OF ALL RESPONSES TO PHQ9 QUESTIONS 1 AND 2: 0
1. LITTLE INTEREST OR PLEASURE IN DOING THINGS: NOT AT ALL
2. FEELING DOWN, DEPRESSED OR HOPELESS: NOT AT ALL

## 2025-02-04 NOTE — PROGRESS NOTES
Assessment/Plan   59 years old female was seen on a virtual visit for an acute illness.  On clinical exam patient has viral illness this was discussed in detail.  At this time there is no need for any antibiotics.  Given her GI symptoms this could be norovirus.  Will hold off doing any testings but will treat her symptomatically.  For the nausea Zofran was prescribed and was sent to the pharmacy.  She was advised to drink plenty of fluids and also take Tylenol as needed for fever and generalized bodyaches.  She understands to call us if there is any concern.    The chronic medical problems and the medications were reviewed and she is stable at this time.      Problem List Items Addressed This Visit       Viral illness - Primary    Relevant Medications    ondansetron ODT (Zofran-ODT) 4 mg disintegrating tablet    Nausea    Relevant Medications    ondansetron ODT (Zofran-ODT) 4 mg disintegrating tablet       Subjective     Patient ID: Nano Valverde is a 59 y.o. female who presents for Diarrhea and Vomiting.  Virtual or Telephone Consent    An interactive audio and video telecommunication system which permits real time communications between the patient (at the originating site) and provider (at the distant site) was utilized to provide this telehealth service.   Verbal consent was requested and obtained from Nano Valverde on this date, 02/04/25 for a telehealth visit.   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  She has had questionable contact history with viral illness.  She has significant nausea and she wants something to be done for the nausea.  She denies any chest pain or short of breath.  She had some fever.  Has some headache.  No other significant complaints.      Family History   Problem Relation Name Age of Onset    Heart attack Mother       Arthritis Mother      Diabetes Mother      Hyperlipidemia Mother      Hypertension Mother      Osteoarthritis Mother          Knee Replacement    Cataracts Mother          Surgery    Aortic aneurysm Father      Heart failure Father      Depression Daughter      Bipolar disorder Son      Hypertension Son      Diabetes Maternal Grandmother      Hyperlipidemia Maternal Grandmother      Hypertension Maternal Grandmother      Osteoporosis Maternal Grandmother      Rheum arthritis Maternal Grandmother      Thyroid disease Maternal Grandmother      Cataracts Maternal Grandmother          Surgery    Other (Abdominal Aorta Injury) Paternal Grandfather        Social History     Socioeconomic History    Marital status:      Spouse name: Not on file    Number of children: Not on file    Years of education: Not on file    Highest education level: Not on file   Occupational History    Not on file   Tobacco Use    Smoking status: Never    Smokeless tobacco: Never   Vaping Use    Vaping status: Never Used   Substance and Sexual Activity    Alcohol use: Not Currently    Drug use: Never    Sexual activity: Not on file   Other Topics Concern    Not on file   Social History Narrative    Not on file     Social Drivers of Health     Financial Resource Strain: Not on file   Food Insecurity: Not on file   Transportation Needs: Not on file   Physical Activity: Not on file   Stress: Not on file   Social Connections: Not on file   Intimate Partner Violence: Not on file   Housing Stability: Not on file      Morphine, Nsaids (non-steroidal anti-inflammatory drug), and Penicillins   Current Outpatient Medications   Medication Sig Dispense Refill    gabapentin (Neurontin) 400 mg capsule Take 2 capsules (800 mg) by mouth 2 times a day. 180 capsule 3    magnesium oxide 400 mg magnesium capsule Take 1 capsule (400 mg) by mouth once daily.      potassium gluconate 550 mg (90 mg) tablet Take 1 tablet by mouth once daily.      SEMAGLUTIDE  SUBQ Inject under the skin.      tiZANidine (Zanaflex) 4 mg tablet Take 1 tablet (4 mg) by mouth as needed at bedtime for muscle spasms. 90 tablet 3    topiramate (Topamax) 100 mg tablet TAKE 1 TABLET BY MOUTH TWO TIMES A  tablet 3    ondansetron ODT (Zofran-ODT) 4 mg disintegrating tablet Dissolve 1 tablet (4 mg) in the mouth every 8 hours if needed for nausea or vomiting for up to 7 days. 20 tablet 0     No current facility-administered medications for this visit.          Objective     There were no vitals filed for this visit.     Physical Exam    Limited exam on a virtual video visit.  Patient was lying on the bed.  Alert oriented.  Able to give detailed history.  No facial puffiness or JVD.  No dyspnea.    Problem List Items Addressed This Visit       Viral illness - Primary    Relevant Medications    ondansetron ODT (Zofran-ODT) 4 mg disintegrating tablet    Nausea    Relevant Medications    ondansetron ODT (Zofran-ODT) 4 mg disintegrating tablet        No orders of the defined types were placed in this encounter.       Lab Results   Component Value Date    WBC 5.1 10/03/2024    HGB 14.6 10/03/2024    HCT 46.4 (H) 10/03/2024     10/03/2024    CHOL 194 10/03/2024    TRIG 60 10/03/2024    HDL 74.1 10/03/2024    ALT 25 10/03/2024    AST 21 10/03/2024     10/03/2024    K 4.4 10/03/2024     10/03/2024    CREATININE 0.73 10/03/2024    BUN 13 10/03/2024    CO2 31 10/03/2024    TSH 3.19 10/03/2024     Lab Results   Component Value Date    CHOL 194 10/03/2024    LDLCALC 108 (H) 10/03/2024    CHHDL 2.6 10/03/2024       No results found.

## 2025-02-12 ENCOUNTER — APPOINTMENT (OUTPATIENT)
Dept: PRIMARY CARE | Facility: CLINIC | Age: 60
End: 2025-02-12
Payer: COMMERCIAL

## 2025-02-27 ENCOUNTER — APPOINTMENT (OUTPATIENT)
Dept: PRIMARY CARE | Facility: CLINIC | Age: 60
End: 2025-02-27
Payer: COMMERCIAL

## 2025-03-03 ENCOUNTER — APPOINTMENT (OUTPATIENT)
Dept: PRIMARY CARE | Facility: CLINIC | Age: 60
End: 2025-03-03
Payer: COMMERCIAL

## 2025-03-06 PROCEDURE — RXMED WILLOW AMBULATORY MEDICATION CHARGE

## 2025-03-10 ENCOUNTER — APPOINTMENT (OUTPATIENT)
Dept: PRIMARY CARE | Facility: CLINIC | Age: 60
End: 2025-03-10
Payer: COMMERCIAL

## 2025-03-10 VITALS
HEIGHT: 65 IN | HEART RATE: 60 BPM | SYSTOLIC BLOOD PRESSURE: 100 MMHG | BODY MASS INDEX: 27.69 KG/M2 | DIASTOLIC BLOOD PRESSURE: 62 MMHG | TEMPERATURE: 98.1 F | WEIGHT: 166.2 LBS

## 2025-03-10 DIAGNOSIS — Z71.3 ENCOUNTER FOR WEIGHT LOSS COUNSELING: Primary | ICD-10-CM

## 2025-03-10 DIAGNOSIS — Z12.31 ENCOUNTER FOR SCREENING MAMMOGRAM FOR BREAST CANCER: ICD-10-CM

## 2025-03-10 DIAGNOSIS — M54.9 CHRONIC BACK PAIN, UNSPECIFIED BACK LOCATION, UNSPECIFIED BACK PAIN LATERALITY: ICD-10-CM

## 2025-03-10 DIAGNOSIS — G89.29 CHRONIC BACK PAIN, UNSPECIFIED BACK LOCATION, UNSPECIFIED BACK PAIN LATERALITY: ICD-10-CM

## 2025-03-10 DIAGNOSIS — Z82.49 FAMILY HISTORY OF CORONARY ARTERY DISEASE IN MOTHER: ICD-10-CM

## 2025-03-10 DIAGNOSIS — M79.7 FIBROMYALGIA: ICD-10-CM

## 2025-03-10 DIAGNOSIS — E66.3 OVERWEIGHT WITH BODY MASS INDEX (BMI) OF 28 TO 28.9 IN ADULT: ICD-10-CM

## 2025-03-10 DIAGNOSIS — E78.5 MILD HYPERLIPIDEMIA: ICD-10-CM

## 2025-03-10 PROCEDURE — 3008F BODY MASS INDEX DOCD: CPT | Performed by: INTERNAL MEDICINE

## 2025-03-10 PROCEDURE — 1036F TOBACCO NON-USER: CPT | Performed by: INTERNAL MEDICINE

## 2025-03-10 PROCEDURE — 99213 OFFICE O/P EST LOW 20 MIN: CPT | Performed by: INTERNAL MEDICINE

## 2025-03-10 ASSESSMENT — ENCOUNTER SYMPTOMS
LIGHT-HEADEDNESS: 0
DIARRHEA: 0
AGITATION: 0
VOMITING: 0
DYSURIA: 0
SORE THROAT: 0
COUGH: 0
CONSTIPATION: 0
FEVER: 0
SHORTNESS OF BREATH: 0
CONFUSION: 0
BLOOD IN STOOL: 0
ABDOMINAL PAIN: 0
CHILLS: 0
DIZZINESS: 0
NAUSEA: 0

## 2025-03-10 ASSESSMENT — PATIENT HEALTH QUESTIONNAIRE - PHQ9
1. LITTLE INTEREST OR PLEASURE IN DOING THINGS: NOT AT ALL
SUM OF ALL RESPONSES TO PHQ9 QUESTIONS 1 AND 2: 0
2. FEELING DOWN, DEPRESSED OR HOPELESS: NOT AT ALL

## 2025-03-10 NOTE — PROGRESS NOTES
"Subjective   Patient ID: Nano Valverde is a 59 y.o. female who presents for Follow-up (Med refill).    HPI patient is a 59-year-old female presents to the office today for medication refills and 6-month follow-up.  Overall she is doing well.  States that she stopped taking compounding semaglutide around November since that time has gained weight approximately 20 pounds.  She would like to restart compounded semaglutide.  Previously she was on the 1.8 mg weekly dose.  Her goal weight is around 145 pounds.  We discussed starting her on a lower maintenance dose since she has not been taking the medication recently.  She has done very well with semaglutide in the past.  She is aware of any possible side effects and did not have any of these in the past.  We reviewed her most recent blood work with her to in the office today that she had completed in October.  She is due for mammogram which will be ordered also would like to pursue CT cardiac scoring test which has been ordered.  She defers all vaccinations.  Denies any fever, chills, chest pain or shortness of breath, nausea, vomit diarrhea, abdominal pain.    Review of Systems   Constitutional:  Negative for chills and fever.   HENT:  Negative for sore throat.    Eyes:  Negative for visual disturbance.   Respiratory:  Negative for cough and shortness of breath.    Cardiovascular:  Negative for chest pain and leg swelling.   Gastrointestinal:  Negative for abdominal pain, blood in stool, constipation, diarrhea, nausea and vomiting.   Genitourinary:  Negative for dysuria.   Skin:  Negative for rash.   Neurological:  Negative for dizziness, syncope and light-headedness.   Psychiatric/Behavioral:  Negative for agitation and confusion.        Objective   /62 (BP Location: Left arm, Patient Position: Sitting, BP Cuff Size: Adult)   Pulse 60   Temp 36.7 °C (98.1 °F)   Ht 1.638 m (5' 4.5\")   Wt 75.4 kg (166 lb 3.2 oz)   BMI 28.09 kg/m²     Physical Exam  Vitals " and nursing note reviewed.   Constitutional:       General: She is not in acute distress.     Appearance: Normal appearance. She is not ill-appearing, toxic-appearing or diaphoretic.   HENT:      Head: Normocephalic and atraumatic.      Mouth/Throat:      Mouth: Mucous membranes are moist.      Pharynx: Oropharynx is clear. No oropharyngeal exudate.   Cardiovascular:      Rate and Rhythm: Normal rate and regular rhythm.      Heart sounds: Normal heart sounds.   Pulmonary:      Effort: Pulmonary effort is normal. No respiratory distress.      Breath sounds: Normal breath sounds. No wheezing, rhonchi or rales.   Musculoskeletal:      Right lower leg: No edema.      Left lower leg: No edema.   Skin:     General: Skin is warm and dry.      Coloration: Skin is not jaundiced or pale.      Findings: No rash.   Neurological:      General: No focal deficit present.      Mental Status: She is alert and oriented to person, place, and time.      Cranial Nerves: No cranial nerve deficit.   Psychiatric:         Mood and Affect: Mood normal.         Behavior: Behavior normal.         Thought Content: Thought content normal.         Judgment: Judgment normal.         Assessment/Plan   Problem List Items Addressed This Visit             ICD-10-CM    Encounter for screening mammogram for breast cancer Z12.31    Relevant Orders    BI mammo bilateral screening tomosynthesis    Overweight with body mass index (BMI) of 28 to 28.9 in adult E66.3, Z68.28    Relevant Orders    CT cardiac scoring wo IV contrast    Family history of coronary artery disease in mother Z82.49    Relevant Orders    CT cardiac scoring wo IV contrast    Mild hyperlipidemia E78.5    Relevant Orders    CT cardiac scoring wo IV contrast    Encounter for weight loss counseling - Primary Z71.3     Counter for weight loss counseling/overweight/mild hyperlipidemia: Will restart compounded semaglutide at this time at a lower dose of 0.6 mg weekly.  Will have her follow-up  in approximately 3 months for recheck sooner if issues arise.  She had done well with his medication in the past without adverse effects.  We may need to uptitrate the dose to what she was on before to get the results that she is looking for in terms of weight loss she was on 1.8 mg weekly dose in the past of semaglutide.  We have also ordered a CT calcium score given her family history of heart disease overweight status mild hyperlipidemia.    Overweight/family history of coronary artery disease in mother/mild hyperlipidemia: We will do CT calcium score at her request    Fibromyalgia/chronic back pain: She remains on gabapentin OARRS report is reviewed and appropriate.  Continue the current medication regimen

## 2025-03-13 ENCOUNTER — PHARMACY VISIT (OUTPATIENT)
Dept: PHARMACY | Facility: CLINIC | Age: 60
End: 2025-03-13
Payer: COMMERCIAL

## 2025-03-13 DIAGNOSIS — Z12.31 ENCOUNTER FOR SCREENING MAMMOGRAM FOR BREAST CANCER: Primary | ICD-10-CM

## 2025-03-13 DIAGNOSIS — E78.5 MILD HYPERLIPIDEMIA: ICD-10-CM

## 2025-03-13 DIAGNOSIS — E66.3 OVERWEIGHT WITH BODY MASS INDEX (BMI) OF 28 TO 28.9 IN ADULT: ICD-10-CM

## 2025-03-20 ENCOUNTER — HOSPITAL ENCOUNTER (OUTPATIENT)
Dept: RADIOLOGY | Facility: HOSPITAL | Age: 60
Discharge: HOME | End: 2025-03-20
Payer: COMMERCIAL

## 2025-03-20 DIAGNOSIS — Z12.31 ENCOUNTER FOR SCREENING MAMMOGRAM FOR BREAST CANCER: ICD-10-CM

## 2025-03-20 PROCEDURE — 77063 BREAST TOMOSYNTHESIS BI: CPT | Performed by: RADIOLOGY

## 2025-03-20 PROCEDURE — 77063 BREAST TOMOSYNTHESIS BI: CPT

## 2025-03-20 PROCEDURE — 77067 SCR MAMMO BI INCL CAD: CPT | Performed by: RADIOLOGY

## 2025-03-26 ENCOUNTER — APPOINTMENT (OUTPATIENT)
Dept: PRIMARY CARE | Facility: CLINIC | Age: 60
End: 2025-03-26
Payer: COMMERCIAL

## 2025-04-20 DIAGNOSIS — M79.7 FIBROMYALGIA: Primary | ICD-10-CM

## 2025-04-20 DIAGNOSIS — M54.9 CHRONIC BACK PAIN, UNSPECIFIED BACK LOCATION, UNSPECIFIED BACK PAIN LATERALITY: ICD-10-CM

## 2025-04-20 DIAGNOSIS — G89.29 CHRONIC BACK PAIN, UNSPECIFIED BACK LOCATION, UNSPECIFIED BACK PAIN LATERALITY: ICD-10-CM

## 2025-04-21 ENCOUNTER — HOSPITAL ENCOUNTER (OUTPATIENT)
Dept: RADIOLOGY | Facility: HOSPITAL | Age: 60
Discharge: HOME | End: 2025-04-21
Payer: COMMERCIAL

## 2025-04-21 DIAGNOSIS — E78.5 MILD HYPERLIPIDEMIA: ICD-10-CM

## 2025-04-21 DIAGNOSIS — E66.3 OVERWEIGHT WITH BODY MASS INDEX (BMI) OF 28 TO 28.9 IN ADULT: ICD-10-CM

## 2025-04-21 DIAGNOSIS — Z82.49 FAMILY HISTORY OF CORONARY ARTERY DISEASE IN MOTHER: ICD-10-CM

## 2025-04-21 PROCEDURE — 75571 CT HRT W/O DYE W/CA TEST: CPT

## 2025-04-21 RX ORDER — GABAPENTIN 400 MG/1
800 CAPSULE ORAL 2 TIMES DAILY
Qty: 360 CAPSULE | Refills: 1 | Status: SHIPPED | OUTPATIENT
Start: 2025-04-21 | End: 2025-10-18

## 2025-04-22 PROCEDURE — RXMED WILLOW AMBULATORY MEDICATION CHARGE

## 2025-05-01 ENCOUNTER — PHARMACY VISIT (OUTPATIENT)
Dept: PHARMACY | Facility: CLINIC | Age: 60
End: 2025-05-01
Payer: COMMERCIAL